# Patient Record
Sex: FEMALE | URBAN - METROPOLITAN AREA
[De-identification: names, ages, dates, MRNs, and addresses within clinical notes are randomized per-mention and may not be internally consistent; named-entity substitution may affect disease eponyms.]

---

## 2022-10-11 ENCOUNTER — OFFICE VISIT (OUTPATIENT)
Dept: URBAN - METROPOLITAN AREA CLINIC 24 | Facility: CLINIC | Age: 57
End: 2022-10-11
Payer: COMMERCIAL

## 2022-10-11 DIAGNOSIS — H02.834 DERMATOCHALASIS OF LEFT UPPER LID: ICD-10-CM

## 2022-10-11 DIAGNOSIS — D23.10 OTH BENIGN NEOPLASM SKIN/ UNSP EYELID, INCLUDING CANTHUS: ICD-10-CM

## 2022-10-11 DIAGNOSIS — H02.831 DERMATOCHALASIS OF RIGHT UPPER LID: Primary | ICD-10-CM

## 2022-10-11 PROCEDURE — 92134 CPTRZ OPH DX IMG PST SGM RTA: CPT | Performed by: OPTOMETRIST

## 2022-10-11 PROCEDURE — 99204 OFFICE O/P NEW MOD 45 MIN: CPT | Performed by: OPTOMETRIST

## 2022-10-11 ASSESSMENT — KERATOMETRY
OS: 42.89
OD: 43.02

## 2022-10-11 ASSESSMENT — INTRAOCULAR PRESSURE
OD: 15
OS: 15

## 2022-10-11 ASSESSMENT — VISUAL ACUITY
OS: 20/25
OD: 20/20

## 2022-10-11 NOTE — IMPRESSION/PLAN
Impression: Oth benign neoplasm skin/ unsp eyelid, including canthus: D23.10. Plan: Lesion UL x 6 months. Denies pain, interfering with vision. Would like it evaluated by oculoplastics.

## 2022-10-11 NOTE — IMPRESSION/PLAN
Impression: Dermatochalasis of right upper lid: H02.831. Plan: Bothersome to pt, will refer to oculoplastics for consultation.

## 2022-11-14 ENCOUNTER — OFFICE VISIT (OUTPATIENT)
Dept: URBAN - METROPOLITAN AREA CLINIC 24 | Facility: CLINIC | Age: 57
End: 2022-11-14
Payer: COMMERCIAL

## 2022-11-14 DIAGNOSIS — H02.834 DERMATOCHALASIS OF LEFT UPPER LID: ICD-10-CM

## 2022-11-14 DIAGNOSIS — H02.831 DERMATOCHALASIS OF RIGHT UPPER LID: Primary | ICD-10-CM

## 2022-11-14 DIAGNOSIS — D48.1 TUMOR OF UNCERTAIN BEHAVIOR OF CONNECTIVE TISSUE OF EYELID: ICD-10-CM

## 2022-11-14 DIAGNOSIS — Z41.1 ENCOUNTER FOR COSMETIC SURGERY: ICD-10-CM

## 2022-11-14 PROCEDURE — 92004 COMPRE OPH EXAM NEW PT 1/>: CPT | Performed by: STUDENT IN AN ORGANIZED HEALTH CARE EDUCATION/TRAINING PROGRAM

## 2022-11-14 PROCEDURE — 92285 EXTERNAL OCULAR PHOTOGRAPHY: CPT | Performed by: STUDENT IN AN ORGANIZED HEALTH CARE EDUCATION/TRAINING PROGRAM

## 2022-11-14 PROCEDURE — 92081 LIMITED VISUAL FIELD XM: CPT | Performed by: STUDENT IN AN ORGANIZED HEALTH CARE EDUCATION/TRAINING PROGRAM

## 2022-11-14 RX ORDER — CEPHALEXIN 500 MG/1
500 MG CAPSULE ORAL
Qty: 14 | Refills: 0 | Status: ACTIVE
Start: 2022-11-14

## 2022-11-14 RX ORDER — NEOMYCIN SULFATE, POLYMYXIN B SULFATE AND DEXAMETHASONE 3.5; 10000; 1 MG/G; [USP'U]/G; MG/G
OINTMENT OPHTHALMIC
Qty: 2 | Refills: 2 | Status: ACTIVE
Start: 2022-11-14

## 2022-11-14 NOTE — IMPRESSION/PLAN
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 Plan: Neoplasm RUL. Will seek authorization from insurance and will perform neoplasm excision with biopsy next visit. Lesion appears vascular. Onset 6 months ago, growing since then. No pain. No trauma. No prior surgeries. Recommend excision of lesion in procedure clinic prior to having upper lid blepharoplasty surgery.

## 2022-11-22 ENCOUNTER — OFFICE VISIT (OUTPATIENT)
Dept: URBAN - METROPOLITAN AREA CLINIC 34 | Facility: CLINIC | Age: 57
End: 2022-11-22
Payer: COMMERCIAL

## 2022-11-22 DIAGNOSIS — D48.1 TUMOR OF UNCERTAIN BEHAVIOR OF CONNECTIVE TISSUE OF EYELID: Primary | ICD-10-CM

## 2022-11-22 PROCEDURE — 67840 REMOVE EYELID LESION: CPT | Performed by: STUDENT IN AN ORGANIZED HEALTH CARE EDUCATION/TRAINING PROGRAM

## 2022-11-22 RX ORDER — NEOMYCIN SULFATE, POLYMYXIN B SULFATE AND DEXAMETHASONE 3.5; 10000; 1 MG/G; [USP'U]/G; MG/G
OINTMENT OPHTHALMIC
Qty: 3.5 | Refills: 0 | Status: ACTIVE
Start: 2022-11-22

## 2022-11-22 NOTE — IMPRESSION/PLAN
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 Plan: RUL lesion, appears vascular. Risks, benefits, and alternatives of procedure were discussed. Risks include, but are not limited to, bleeding, infection, scarring, asymmetry, need for additional surgery, injury to eye/vision loss. Written informed consent obtained and all of patient's questions answered. Procedure Note - The eyelid was cleaned and then anesthetized with 2% lidocaine with epinephrine. An incision was made wtih #15 blade, approximately 0.5cm in length. The lesion was dissected from the overlying skin and underlying orbicularis. It appeared vascular in nature without any large feeder vessels. The lesion was completely excised and submitted for histopathologic diagnosis. The biopsy site was cauterized as necessary to achieve hemostasis. The skin was closed with 2 interrupted 6-0 plain gut sutures. Antibiotic ointment was applied to the biopsy site. The patient tolerated the procedure well, and the procedure was completed without complications. The patient will be contacted with the results of pathology. Apply abx ointment bid x 1-2 weeks. Post op instructions reviewed. Return oculoplastics clinic prn. Call if any questions/concerns.

## 2022-12-13 ENCOUNTER — ADULT PHYSICAL (OUTPATIENT)
Dept: URBAN - METROPOLITAN AREA CLINIC 24 | Facility: CLINIC | Age: 57
End: 2022-12-13
Payer: COMMERCIAL

## 2022-12-13 DIAGNOSIS — H02.831 DERMATOCHALASIS OF RIGHT UPPER EYELID: ICD-10-CM

## 2022-12-13 DIAGNOSIS — H02.834 DERMATOCHALASIS OF LEFT UPPER EYELID: ICD-10-CM

## 2022-12-13 DIAGNOSIS — Z01.818 ENCOUNTER FOR OTHER PREPROCEDURAL EXAMINATION: Primary | ICD-10-CM

## 2022-12-13 PROCEDURE — 99202 OFFICE O/P NEW SF 15 MIN: CPT | Performed by: PHYSICIAN ASSISTANT

## 2022-12-20 ENCOUNTER — PROCEDURE (OUTPATIENT)
Dept: URBAN - METROPOLITAN AREA CLINIC 34 | Facility: CLINIC | Age: 57
End: 2022-12-20

## 2022-12-29 ENCOUNTER — POST-OPERATIVE VISIT (OUTPATIENT)
Dept: URBAN - METROPOLITAN AREA CLINIC 10 | Facility: CLINIC | Age: 57
End: 2022-12-29
Payer: COMMERCIAL

## 2022-12-29 DIAGNOSIS — Z48.89 ENCOUNTER FOR OTHER SPECIFIED SURGICAL AFTERCARE: Primary | ICD-10-CM

## 2022-12-29 PROCEDURE — 99024 POSTOP FOLLOW-UP VISIT: CPT | Performed by: STUDENT IN AN ORGANIZED HEALTH CARE EDUCATION/TRAINING PROGRAM

## 2022-12-29 NOTE — IMPRESSION/PLAN
Impression:  Encounter for other specified surgical aftercare  Z48.89. Plan: Upper lids and brow incisions healing well. Sutures and staples removed today. CNVII intact. No signs of infection. Some residual bruising/swelling as expected. Patient pleased with results. Recommend continue post op cares as instructed. Return 8-12 weeks. Sooner prn.

## 2023-03-21 ENCOUNTER — POST-OPERATIVE VISIT (OUTPATIENT)
Dept: URBAN - METROPOLITAN AREA CLINIC 24 | Facility: CLINIC | Age: 58
End: 2023-03-21
Payer: COMMERCIAL

## 2023-03-21 DIAGNOSIS — Z48.89 ENCOUNTER FOR OTHER SPECIFIED SURGICAL AFTERCARE: Primary | ICD-10-CM

## 2023-03-21 PROCEDURE — 99024 POSTOP FOLLOW-UP VISIT: CPT | Performed by: STUDENT IN AN ORGANIZED HEALTH CARE EDUCATION/TRAINING PROGRAM

## 2023-03-21 NOTE — IMPRESSION/PLAN
Impression: S/P BULB and cosmetic pretrichial brow lift OU - . Encounter for other specified surgical aftercare  Z48.89. Plan: Well healed. Patient pleased with results. Return oculoplastics prn.